# Patient Record
Sex: FEMALE | Race: WHITE | NOT HISPANIC OR LATINO | URBAN - METROPOLITAN AREA
[De-identification: names, ages, dates, MRNs, and addresses within clinical notes are randomized per-mention and may not be internally consistent; named-entity substitution may affect disease eponyms.]

---

## 2020-12-01 ENCOUNTER — EMERGENCY (EMERGENCY)
Facility: HOSPITAL | Age: 20
LOS: 1 days | Discharge: HOME | End: 2020-12-01
Admitting: EMERGENCY MEDICINE
Payer: COMMERCIAL

## 2020-12-01 VITALS
RESPIRATION RATE: 18 BRPM | TEMPERATURE: 98 F | DIASTOLIC BLOOD PRESSURE: 90 MMHG | WEIGHT: 149.91 LBS | SYSTOLIC BLOOD PRESSURE: 135 MMHG | OXYGEN SATURATION: 100 % | HEART RATE: 81 BPM
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
## 2020-12-01 DIAGNOSIS — M79.645 PAIN IN LEFT FINGER(S): ICD-10-CM

## 2020-12-01 DIAGNOSIS — S62.627A DISPLACED FRACTURE OF MIDDLE PHALANX OF LEFT LITTLE FINGER, INITIAL ENCOUNTER FOR CLOSED FRACTURE: ICD-10-CM

## 2020-12-01 PROCEDURE — 73140 X-RAY EXAM OF FINGER(S): CPT

## 2020-12-01 PROCEDURE — 99283 EMERGENCY DEPT VISIT LOW MDM: CPT | Mod: 25

## 2020-12-01 PROCEDURE — 99284 EMERGENCY DEPT VISIT MOD MDM: CPT | Mod: 25

## 2020-12-01 PROCEDURE — 73140 X-RAY EXAM OF FINGER(S): CPT | Mod: 26,LT

## 2020-12-01 PROCEDURE — 26720 TREAT FINGER FRACTURE EACH: CPT | Mod: F4

## 2020-12-01 RX ORDER — OXYCODONE AND ACETAMINOPHEN 5; 325 MG/1; MG/1
1 TABLET ORAL
Qty: 9 | Refills: 0
Start: 2020-12-01 | End: 2020-12-03
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
## 2020-12-01 NOTE — ED PROVIDER NOTE - CARE PROVIDER_API CALL
Hero Albert)  Plastic Surgery; Surgery of the Hand  [address] [place] [address], [address], [place] [address]  Phone: [phone]  Fax: [phone]  Follow Up Time:

## 2020-12-01 NOTE — ED ADULT NURSE NOTE - CINV DISCH TEACH PARTICIP
Hypokalemia E87.6 CBC WITH AUTO DIFFERENTIAL     Comprehensive Metabolic Panel   3. HTN (hypertension), benign I10 CBC WITH AUTO DIFFERENTIAL     Comprehensive Metabolic Panel       PLAN:   Continue same treatment  Low-sodium low-fat diet  Exercises  Repeat the lab work before the next visit  Return to clinic earlier if any problems        Patient Instructions   Continue present treatment  Return to clinic earlier if any problems      Return in about 3 months (around 5/17/2020). Amada Gordillo reviewed my findings and recommendations with Karina Hearn.     Electronicallysigned by Eagle Augustin MD on 2/17/20 at 4:44 PM
Patient/Parent(s)

## 2020-12-01 NOTE — ED PROVIDER NOTE - NSFOLLOWUPINSTRUCTIONS_ED_ALL_ED_FT
Finger Fracture  WHAT YOU NEED TO KNOW:  A finger fracture is a break in one or more of the bones in your finger.  DISCHARGE INSTRUCTIONS:  Return to the emergency department if:   •Your cast or splint gets wet, damaged, or comes off.  •Your splint or cast feels too tight.  •You have severe pain.  •Your injured finger is numb, cold, or pale.  Call your doctor or hand specialist if:   •Your pain or swelling gets worse, even after treatment.  •You have questions or concerns about your condition or care.  Medicines: You may need any of the following:   •NSAIDs, such as ibuprofen, help decrease swelling, pain, and fever. This medicine is available with or without a doctor's order. NSAIDs can cause stomach bleeding or kidney problems in certain people. If you take blood thinner medicine, always ask your healthcare provider if NSAIDs are safe for you. Always read the medicine label and follow directions.  •Acetaminophen decreases pain and fever. It is available without a doctor's order. Ask how much to take and how often to take it. Follow directions. Read the labels of all other medicines you are using to see if they also contain acetaminophen, or ask your doctor or pharmacist. Acetaminophen can cause liver damage if not taken correctly. Do not use more than 4 grams (4,000 milligrams) total of acetaminophen in one day.   •Prescription pain medicine may be given. Ask your healthcare provider how to take this medicine safely. Some prescription pain medicines contain acetaminophen. Do not take other medicines that contain acetaminophen without talking to your healthcare provider. Too much acetaminophen may cause liver damage. Prescription pain medicine may cause constipation. Ask your healthcare provider how to prevent or treat constipation.   •Take your medicine as directed. Contact your healthcare provider if you think your medicine is not helping or if you have side effects. Tell him or her if you are allergic to any medicine. Keep a list of the medicines, vitamins, and herbs you take. Include the amounts, and when and why you take them. Bring the list or the pill bottles to follow-up visits. Carry your medicine list with you in case of an emergency.  Self-care:   •Wear your splint as directed. Do not remove your splint until you follow up with your healthcare provider or hand specialist.  •Apply ice on your finger for 15 to 20 minutes every hour or as directed. Use an ice pack, or put crushed ice in a plastic bag. Cover it with a towel before you apply it to your skin. Ice helps prevent tissue damage and decreases swelling and pain.  •Elevate your finger above the level of your heart as often as you can. This will help decrease swelling and pain. Prop your hand on pillows or blankets to keep it elevated comfortably  •Go to physical therapy as directed. A physical therapist teaches you exercises to help improve movement and strength, and to decrease pain.

## 2020-12-01 NOTE — ED PROVIDER NOTE - MUSCULOSKELETAL, MLM
Spine appears normal, range of motion is not limited, no muscle or joint tenderness; L hand: no wrist tend, radial pulse 2+, no tend over metacarpals, + ecchymosis over 5th PIP, + tend over PIP, + light touch, cap refill < 2sec

## 2020-12-01 NOTE — ED PROVIDER NOTE - PATIENT PORTAL LINK FT
You can access the FollowMyHealth Patient Portal offered by [facility] by registering at the following website: [email or web address]. By joining [facility]’s FollowMyHealth portal, you will also be able to view your health information using other applications (apps) compatible with our system.
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
## 2020-12-08 ENCOUNTER — OUTPATIENT (OUTPATIENT)
Dept: OUTPATIENT SERVICES | Facility: HOSPITAL | Age: 20
LOS: 1 days | Discharge: HOME | End: 2020-12-08
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
